# Patient Record
Sex: FEMALE | Race: WHITE | NOT HISPANIC OR LATINO | URBAN - NONMETROPOLITAN AREA
[De-identification: names, ages, dates, MRNs, and addresses within clinical notes are randomized per-mention and may not be internally consistent; named-entity substitution may affect disease eponyms.]

---

## 2019-07-23 ENCOUNTER — IMPORTED ENCOUNTER (OUTPATIENT)
Dept: URBAN - NONMETROPOLITAN AREA CLINIC 1 | Facility: CLINIC | Age: 29
End: 2019-07-23

## 2019-07-23 PROBLEM — H52.221: Noted: 2019-07-23

## 2019-07-23 PROBLEM — H52.13: Noted: 2018-04-05

## 2019-07-23 PROCEDURE — 92310 CONTACT LENS FITTING OU: CPT

## 2019-07-23 PROCEDURE — S0621 ROUTINE OPHTHALMOLOGICAL EXA: HCPCS

## 2019-07-23 NOTE — PATIENT DISCUSSION
Compound Myopia Astigmatism OD/Simple Myopia OS-  discussed findings w/patient-  new spectacle/CL Rx issued-  continue to monitor yearly or prn; 's Notes: MR 7/23/2019DFE 7/23/2019

## 2022-04-10 ASSESSMENT — TONOMETRY
OD_IOP_MMHG: 15
OS_IOP_MMHG: 16

## 2022-04-10 ASSESSMENT — VISUAL ACUITY
OD_SC: 20/20
OS_SC: 20/20
OU_CC: 20/20